# Patient Record
Sex: MALE | URBAN - METROPOLITAN AREA
[De-identification: names, ages, dates, MRNs, and addresses within clinical notes are randomized per-mention and may not be internally consistent; named-entity substitution may affect disease eponyms.]

---

## 2018-03-20 ENCOUNTER — APPOINTMENT (RX ONLY)
Dept: URBAN - METROPOLITAN AREA CLINIC 88 | Facility: CLINIC | Age: 38
Setting detail: DERMATOLOGY
End: 2018-03-20

## 2018-03-20 DIAGNOSIS — B36.8 OTHER SPECIFIED SUPERFICIAL MYCOSES: ICD-10-CM

## 2018-03-20 PROCEDURE — 99202 OFFICE O/P NEW SF 15 MIN: CPT

## 2018-03-20 PROCEDURE — ? PRESCRIPTION

## 2018-03-20 PROCEDURE — ? COUNSELING

## 2018-03-20 RX ORDER — KETOCONAZOLE 20.5 MG/ML
SHAMPOO, SUSPENSION TOPICAL
Qty: 1 | Refills: 2 | Status: ERX | COMMUNITY
Start: 2018-03-20

## 2018-03-20 RX ORDER — KETOCONAZOLE 20 MG/G
CREAM TOPICAL
Qty: 1 | Refills: 2 | Status: ERX | COMMUNITY
Start: 2018-03-20

## 2018-03-20 RX ORDER — ITRACONAZOLE 100 MG/1
CAPSULE ORAL QD
Qty: 28 | Refills: 0 | Status: ERX | COMMUNITY
Start: 2018-03-20

## 2018-03-20 RX ADMIN — KETOCONAZOLE: 20.5 SHAMPOO, SUSPENSION TOPICAL at 18:56

## 2018-03-20 RX ADMIN — ITRACONAZOLE: 100 CAPSULE ORAL at 18:55

## 2018-03-20 RX ADMIN — KETOCONAZOLE: 20 CREAM TOPICAL at 18:55

## 2018-03-20 ASSESSMENT — LOCATION DETAILED DESCRIPTION DERM: LOCATION DETAILED: SUPERIOR THORACIC SPINE

## 2018-03-20 ASSESSMENT — LOCATION ZONE DERM: LOCATION ZONE: TRUNK

## 2018-03-20 ASSESSMENT — LOCATION SIMPLE DESCRIPTION DERM: LOCATION SIMPLE: UPPER BACK

## 2018-04-03 ENCOUNTER — APPOINTMENT (RX ONLY)
Dept: URBAN - METROPOLITAN AREA CLINIC 88 | Facility: CLINIC | Age: 38
Setting detail: DERMATOLOGY
End: 2018-04-03

## 2018-04-03 DIAGNOSIS — B36.8 OTHER SPECIFIED SUPERFICIAL MYCOSES: ICD-10-CM | Status: RESOLVING

## 2018-04-03 PROBLEM — L70.0 ACNE VULGARIS: Status: ACTIVE | Noted: 2018-04-03

## 2018-04-03 PROCEDURE — ? TREATMENT REGIMEN

## 2018-04-03 PROCEDURE — 99213 OFFICE O/P EST LOW 20 MIN: CPT

## 2018-04-03 ASSESSMENT — LOCATION ZONE DERM: LOCATION ZONE: TRUNK

## 2018-04-03 ASSESSMENT — LOCATION DETAILED DESCRIPTION DERM: LOCATION DETAILED: SUPERIOR THORACIC SPINE

## 2018-04-03 ASSESSMENT — LOCATION SIMPLE DESCRIPTION DERM: LOCATION SIMPLE: UPPER BACK

## 2018-04-03 NOTE — PROCEDURE: TREATMENT REGIMEN
Detail Level: Simple
Discontinue Regimen: Patient may d/c oral medication since rash is almost clear.
Continue Regimen: Topical regimen as needed. Recommend he apply cream prior to work.